# Patient Record
Sex: FEMALE | Race: WHITE | NOT HISPANIC OR LATINO
[De-identification: names, ages, dates, MRNs, and addresses within clinical notes are randomized per-mention and may not be internally consistent; named-entity substitution may affect disease eponyms.]

---

## 2023-04-25 ENCOUNTER — APPOINTMENT (OUTPATIENT)
Dept: OBGYN | Facility: CLINIC | Age: 28
End: 2023-04-25
Payer: COMMERCIAL

## 2023-04-25 ENCOUNTER — TRANSCRIPTION ENCOUNTER (OUTPATIENT)
Age: 28
End: 2023-04-25

## 2023-04-25 VITALS
SYSTOLIC BLOOD PRESSURE: 129 MMHG | OXYGEN SATURATION: 97 % | DIASTOLIC BLOOD PRESSURE: 86 MMHG | BODY MASS INDEX: 26.63 KG/M2 | HEIGHT: 64 IN | TEMPERATURE: 98.5 F | HEART RATE: 83 BPM | RESPIRATION RATE: 14 BRPM | WEIGHT: 156 LBS

## 2023-04-25 DIAGNOSIS — Z12.39 ENCOUNTER FOR OTHER SCREENING FOR MALIGNANT NEOPLASM OF BREAST: ICD-10-CM

## 2023-04-25 DIAGNOSIS — Z01.419 ENCOUNTER FOR GYNECOLOGICAL EXAMINATION (GENERAL) (ROUTINE) W/OUT ABNORMAL FINDINGS: ICD-10-CM

## 2023-04-25 PROBLEM — Z00.00 ENCOUNTER FOR PREVENTIVE HEALTH EXAMINATION: Status: ACTIVE | Noted: 2023-04-25

## 2023-04-25 PROCEDURE — 99385 PREV VISIT NEW AGE 18-39: CPT

## 2023-04-25 NOTE — HISTORY OF PRESENT ILLNESS
[FreeTextEntry1] : HPI: Patient is a 29yo female presenting for her well woman exam. \par Patient is without complaints today.\par \par ROS: neg unless specified in HPI\par \par OB Hx: G0\par \par Gyn Hx: \par Menses: monthly, on OCPs\par No known fibroids, ovarian cysts, or other gynecologic problems\par Pap:  none in EMR\par Contraception: OCPs\par \par PMH: hyperprolactinemia, anxiety, depression\par PSH: none\par Meds: Cabergaline, Tri Lo Sprintec, Zoloft, Hydroxyzine\par All: NKDA\par FH: Both maternal and paternal GM with breast CA\par SH: neg x 2\par \par Additional Exam: \par Gyn: Normal appearing external genitalia, normal appearing vagina and cervix, no CMT, bimanual with normal sized  mobile uterus, no fixed adnexal masses or tenderness\par \par Breast: No lymphadenopathy in the neck, chest wall, bilateral supraclavicular, infraclavicular, and bilateral axillary areas. \par No overt asymmetry in bilateral breast contour with normal appearing skin and normal appearing nipple areolar complex bilaterally. \par No nipple discharge expressed.\par \par A/P: 29yo female here for annual exam\par Gyn Screening:\par - Pap: cytology + reflex HR HPV sent\par - STI screening:  patient declined\par \par Breast Screening: \par - Discussed self-breast exam\par - Clinical breast exam performed\par - Breast US ordered due to h/o cyst \par \par AHM: CV, Pulmonary, Endocrine, GI, Bone Screening, Vitamin supplementation, and Immunizations with PCP\par \par RTC 1 year for annual well woman exam\par MUMTAZ Perla MD\par

## 2023-05-05 LAB — CYTOLOGY CVX/VAG DOC THIN PREP: NORMAL

## 2023-05-30 RX ORDER — NORGESTIMATE AND ETHINYL ESTRADIOL 7DAYSX3 LO
0.18/0.215/0.25 KIT ORAL DAILY
Qty: 1 | Refills: 11 | Status: ACTIVE | COMMUNITY
Start: 2023-04-25 | End: 1900-01-01

## 2024-04-30 ENCOUNTER — APPOINTMENT (OUTPATIENT)
Dept: OBGYN | Facility: CLINIC | Age: 29
End: 2024-04-30
Payer: COMMERCIAL

## 2024-04-30 VITALS
HEIGHT: 64 IN | BODY MASS INDEX: 27.83 KG/M2 | WEIGHT: 163 LBS | SYSTOLIC BLOOD PRESSURE: 114 MMHG | DIASTOLIC BLOOD PRESSURE: 68 MMHG

## 2024-04-30 PROCEDURE — 99395 PREV VISIT EST AGE 18-39: CPT

## 2024-04-30 RX ORDER — NITROFURANTOIN (MONOHYDRATE/MACROCRYSTALS) 25; 75 MG/1; MG/1
100 CAPSULE ORAL
Qty: 14 | Refills: 0 | Status: ACTIVE | COMMUNITY
Start: 2024-04-30 | End: 1900-01-01

## 2024-04-30 RX ORDER — HYDROCORTISONE 10 MG/G
1 CREAM TOPICAL TWICE DAILY
Qty: 1 | Refills: 0 | Status: ACTIVE | COMMUNITY
Start: 2024-04-30 | End: 1900-01-01

## 2024-04-30 NOTE — HISTORY OF PRESENT ILLNESS
[FreeTextEntry1] : HPI: Patient is a 27yo female presenting for her well woman exam. Patient is without complaints today.  ROS: neg unless specified in HPI  OB Hx: G0  Gyn Hx: Menses:monthly, on OCPs No known fibroids, ovarian cysts, or other gynecologic problems Pap: none in EMR Contraception: OCPs  PMH: hyperprolactinemia, anxiety, depression PSH: none Meds: Cabergoline, Tri Lo Sprintec, Zoloft, Hydroxyzine All: NKDA FH: Both maternal and paternal GM with breast CA SH: neg x 2  Gyn: Normal appearing external genitalia, normal appearing vagina and cervix, no CMT, bimanual with normal sized mobile uterus, no fixed adnexal masses or tenderness  Breast: No lymphadenopathy in the neck, chest wall, bilateral supraclavicular, infraclavicular, and bilateral axillary areas. No overt asymmetry in bilateral breast contour with normal appearing skin and normal appearing nipple areolar complex bilaterally. No nipple discharge expressed.  A/P: 27yo female here for annual exam Gyn Screening: - Pap: cytology + reflex HR HPV due in 2026, sent per pt request - STI screening: GC/CT/Trich sent  Breast Screening: - Discussed self-breast exam - Clinical breast exam performed  AHM: CV, Pulmonary, Endocrine, GI, Bone Screening, Vitamin supplementation, and Immunizations with PCP  RTC 1 year for annual well woman exam MUMTAZ Perla MD

## 2024-05-02 LAB
C TRACH RRNA SPEC QL NAA+PROBE: NOT DETECTED
N GONORRHOEA RRNA SPEC QL NAA+PROBE: NOT DETECTED
SOURCE TP AMPLIFICATION: NORMAL
T VAGINALIS RRNA SPEC QL NAA+PROBE: NOT DETECTED

## 2024-05-07 LAB — CYTOLOGY CVX/VAG DOC THIN PREP: NORMAL

## 2024-06-27 ENCOUNTER — NON-APPOINTMENT (OUTPATIENT)
Age: 29
End: 2024-06-27

## 2024-07-09 ENCOUNTER — APPOINTMENT (OUTPATIENT)
Dept: ORTHOPEDIC SURGERY | Facility: CLINIC | Age: 29
End: 2024-07-09

## 2024-07-09 VITALS — WEIGHT: 163 LBS | HEIGHT: 64 IN | BODY MASS INDEX: 27.83 KG/M2

## 2024-07-09 DIAGNOSIS — M25.512 PAIN IN LEFT SHOULDER: ICD-10-CM

## 2024-07-09 DIAGNOSIS — M54.14 RADICULOPATHY, THORACIC REGION: ICD-10-CM

## 2024-07-09 PROCEDURE — 72170 X-RAY EXAM OF PELVIS: CPT

## 2024-07-09 PROCEDURE — 99203 OFFICE O/P NEW LOW 30 MIN: CPT | Mod: 25

## 2024-07-09 PROCEDURE — 72100 X-RAY EXAM L-S SPINE 2/3 VWS: CPT

## 2024-07-09 PROCEDURE — 72070 X-RAY EXAM THORAC SPINE 2VWS: CPT

## 2024-07-09 RX ORDER — CABERGOLINE 0.5 MG/1
TABLET ORAL
Refills: 0 | Status: ACTIVE | COMMUNITY

## 2024-07-10 ENCOUNTER — TRANSCRIPTION ENCOUNTER (OUTPATIENT)
Age: 29
End: 2024-07-10

## 2024-08-20 ENCOUNTER — APPOINTMENT (OUTPATIENT)
Dept: ORTHOPEDIC SURGERY | Facility: CLINIC | Age: 29
End: 2024-08-20
Payer: COMMERCIAL

## 2024-08-20 DIAGNOSIS — M54.14 RADICULOPATHY, THORACIC REGION: ICD-10-CM

## 2024-08-20 DIAGNOSIS — M54.12 RADICULOPATHY, CERVICAL REGION: ICD-10-CM

## 2024-08-20 DIAGNOSIS — M25.512 PAIN IN LEFT SHOULDER: ICD-10-CM

## 2024-08-20 PROCEDURE — 99213 OFFICE O/P EST LOW 20 MIN: CPT

## 2024-08-20 NOTE — HISTORY OF PRESENT ILLNESS
[Upper back] : upper back [Mid-back] : mid-back [Gradual] : gradual [Localized] : localized [de-identified] : 7/9/24: 29 F is here for lumbar pain that has been a chronic issue with worsening over the past 2 weeks. She was evaluated previously by urgent care. The pain is intermittent. She has tried Advil. Denies numbness, tingling, radiation, prior injury. She runs and walks for exercise. She works as a .  [] : no [FreeTextEntry5] : no injury, patient has had this pain in the past but feels like it has gotten worse within the last 2 weeks

## 2024-08-20 NOTE — HISTORY OF PRESENT ILLNESS
[Upper back] : upper back [Mid-back] : mid-back [Gradual] : gradual [Localized] : localized [de-identified] : 7/9/24: 29 F is here for lumbar pain that has been a chronic issue with worsening over the past 2 weeks. She was evaluated previously by urgent care. The pain is intermittent. She has tried Advil. Denies numbness, tingling, radiation, prior injury. She runs and walks for exercise. She works as a .  [] : no [FreeTextEntry5] : no injury, patient has had this pain in the past but feels like it has gotten worse within the last 2 weeks

## 2024-08-20 NOTE — REASON FOR VISIT
[FreeTextEntry2] : 08/20/2024 has been doing PT which has helped a bit, still with left periscapular pain, neck and shoulder pain

## 2024-08-20 NOTE — PHYSICAL EXAM
[Left] : left shoulder [] : full range of motion without pain in all planes of motion with good motion and no symptoms [FreeTextEntry8] : medial periscapular tenderness

## 2024-08-26 ENCOUNTER — APPOINTMENT (OUTPATIENT)
Dept: MRI IMAGING | Facility: CLINIC | Age: 29
End: 2024-08-26
Payer: COMMERCIAL

## 2024-08-26 PROCEDURE — 72141 MRI NECK SPINE W/O DYE: CPT

## 2024-08-26 PROCEDURE — 72146 MRI CHEST SPINE W/O DYE: CPT

## 2024-09-03 ENCOUNTER — APPOINTMENT (OUTPATIENT)
Dept: ORTHOPEDIC SURGERY | Facility: CLINIC | Age: 29
End: 2024-09-03
Payer: COMMERCIAL

## 2024-09-03 DIAGNOSIS — M54.14 RADICULOPATHY, THORACIC REGION: ICD-10-CM

## 2024-09-03 DIAGNOSIS — M54.12 RADICULOPATHY, CERVICAL REGION: ICD-10-CM

## 2024-09-03 DIAGNOSIS — S86.892A OTHER INJURY OF OTHER MUSCLE(S) AND TENDON(S) AT LOWER LEG LEVEL, LEFT LEG, INITIAL ENCOUNTER: ICD-10-CM

## 2024-09-03 PROCEDURE — 99213 OFFICE O/P EST LOW 20 MIN: CPT

## 2024-09-03 NOTE — DATA REVIEWED
[Cervical Spine] : cervical spine [MRI] : MRI [Thoracic Spine] : thoracic spine [I reviewed the films/CD and agree] : I reviewed the films/CD and agree [FreeTextEntry1] : non compressive disc bulges [FreeTextEntry2] : no disc herniations

## 2024-09-03 NOTE — REASON FOR VISIT
[FreeTextEntry2] : 09/03/2024 Had MRIs of cervical and thoracic spine. Also has questions regarding left shin splint. Aggravated it during a 10k in May. Had xrays done, tried resting it, but still bothers her to run 08/20/2024 has been doing PT which has helped a bit, still with left periscapular pain, neck and shoulder pain

## 2024-09-03 NOTE — PHYSICAL EXAM
[FreeTextEntry8] : medial periscapular tenderness [Left] : left foot and ankle [] : anterior tibia tenderness

## 2024-09-03 NOTE — HISTORY OF PRESENT ILLNESS
[Upper back] : upper back [Mid-back] : mid-back [Gradual] : gradual [Localized] : localized [de-identified] : 7/9/24: 29 F is here for lumbar pain that has been a chronic issue with worsening over the past 2 weeks. She was evaluated previously by urgent care. The pain is intermittent. She has tried Advil. Denies numbness, tingling, radiation, prior injury. She runs and walks for exercise. She works as a .  [] : no [FreeTextEntry5] : no injury, patient has had this pain in the past but feels like it has gotten worse within the last 2 weeks

## 2024-09-03 NOTE — ASSESSMENT
[FreeTextEntry1] : Her back is feeling better with a change in sleep position For her leg pain, started her on PT